# Patient Record
Sex: FEMALE | Race: WHITE | NOT HISPANIC OR LATINO | URBAN - METROPOLITAN AREA
[De-identification: names, ages, dates, MRNs, and addresses within clinical notes are randomized per-mention and may not be internally consistent; named-entity substitution may affect disease eponyms.]

---
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
## 2023-10-05 NOTE — ED ADULT TRIAGE NOTE - CCCP TRG CHIEF CMPLNT
It was a pleasure taking care of you today.  I've included a brief summary of our discussion and care plan from today's visit below.  Please review this information with your primary care provider.  _______________________________________________________________________    My recommendations are summarized as follows:    Labs and stool studies ordered today   OK to keep metamucil for now daily  Try to increase dietary fiber. A good goal is 30 gram/d. OK to supplement this with metamucil     Please call RN Care Coordinator Margot at [phone] with any questions or concerns.    Return to GI Clinic in 4-6 weeks to review your progress.    _______________________________________________________________________    How do I schedule labs, imaging studies, or procedures that were ordered in clinic today?      Labs: To schedule lab appointment at the [facility], use my chart or call [phone]. If you have a [place] lab closer to home where you are regularly seen you can give them a call.      Procedures: If a colonoscopy, upper endoscopy, breath test, esophageal manometry, or pH impedence was ordered today, our endoscopy team will call you to schedule this. If you have not heard from our endoscopy team within a week, please call [phone] option 2 to schedule.      Imaging Studies: If you were scheduled for a CT scan, X-ray, MRI, ultrasound, HIDA scan or other imaging study, please call [phone] to have this scheduled.      Referral: If a referral to another specialty was ordered, expect a phone call or follow instructions above. If you have not heard from anyone regarding your referral in a week, please call our clinic to check the status.      Who do I call with any questions after my visit?  Please be in touch if there are any further questions that arise following today's visit.  There are multiple ways to contact your gastroenterology care team.       During business hours, you may reach a  Gastroenterology nurse at [phone]     To schedule or reschedule an appointment, please call [phone].      You can always send a secure message through Amitive.  Amitive messages are answered by your nurse or doctor typically within 24 hours.  Please allow extra time on weekends and holidays.       For urgent/emergent questions after business hours, you may reach the on-call GI Fellow by contacting the [facility] at [phone].     How will I get the results of any tests ordered?    You will receive all of your results.  If you have signed up for [employer], any tests ordered at your visit will be available to you after your physician reviews them.  Typically this takes 1-2 weeks.  If there are urgent results that require a change in your care plan, your physician or nurse will call you to discuss the next steps.       What is Amitive?  Amitive is a secure way for you to access all of your healthcare records from the [facility].  It is a web based computer program, so you can sign on to it from any location.  It also allows you to send secure messages to your care team.  I recommend signing up for Amitive access if you have not already done so and are comfortable with using a computer.       How to I schedule a follow-up visit?  If you did not schedule a follow-up visit today, please call [phone] to schedule a follow-up office visit.      Sincerely,    Berna Martin PA-C  Gastroenterology     shortness of breath

## 2024-03-10 PROBLEM — N30.10 INTERSTITIAL CYSTITIS: Status: ACTIVE | Noted: 2021-11-29

## 2024-05-17 NOTE — ED PROVIDER NOTE - PROVIDER TOKENS
Render Risk Assessment In Note?: no Detail Level: Simple Additional Notes: - new onset.\\n- no triggers identified\\n- no family history of autoimmune disease\\n- slowly worsened over the last month PROVIDER:[TOKEN:[[id number]],FOLLOWUP:[1-3 Days]] PROVIDER:[TOKEN:[[id number]],FOLLOWUP:[1-3 Days]],PROVIDER:[TOKEN:[[id number]]]

## 2024-09-11 NOTE — ED ADULT NURSE NOTE - CAS DISCH TRANSFER METHOD
-Asymptomatic   -incidental finding found on CTA  -Seen by vascular surgery and no recommendations for intervention  -Management per primary team   Private car

## 2025-04-21 NOTE — ED PROVIDER NOTE - EKG #1 DATE/TIME
Subjective     Ally Carpio is a 40 y.o. right handed female with hx of UC recently diagnosed in 1/2024 currently on adalimumab since late April of 2024, psoriasis since 2023 (previously on topical treatment only), lTP since November of 2023 (never required treatment), chronic menorrhagia, iron deficiency anemia, RLS since 2014, prior smoking (quit in 2017 smoked 1 ppd for 12 years before that), and hypothyroidism who is here for follow up.       History of Present Illness   [facility] NEURO IMMUNOLOGY HPI: Date of Onset: 2/2024  Date of Diagnosis: diagnosis not finalized yet  Last MRI Brain: 3/2025  Last MRI Cervical: 9/2024  Last MRI Thoracis: 9/2024  Last OCT/VEP: not done  Last CBC: NA  Disease Course at Onset: RR  Disease Course Now: RR  Current Disease Modifying Therapies: None  Previous Disease Modifying Therapies: prednisone  Cerebrospinal Fluid: not done  Gary [place]amo Virus: Positive 9/2024, index 2.8  Varicella Zoster Virus: Positive 9/2024  Hep Panel: negative  Neuromyelitis Optica: negative 9/2024  Myelin Oligodendrocyte Glycoprotein: negative 9/2024    HPI  Interval hx:  Brain MRI in March stable without new or active lesions. She messaged me about nee bilateral shoulder weakness and right shoulder pain. I ordered muscle enzymes and inflammatory markers that all came back negative. She saw orthopedics and is being referred to a shoulder specialist. OCT machine still down.     MS Symptom Review: Weakness: yes left then right leg, also subjective weakness of both shoulders.   Sensory Changes: yes presenting symptom in the whole body   Incoordination: yes both hands  Falling: once  Gait Change: limping  Painful Vision Loss: bilateral R>L visual blurring with pain + floaters in the right eye  Double Vision: No double vision   Vertigo: woozy and nauseous  Facial/Bulvar Weakness: No facial/bulvar weakness  Bladder/Bowel Dysfunction: yes transient frequency, urgency, and occasional  incontinence  Spasticity: No spasticity  Tonic Spasms: yes isometric painful spasms in all limbs and the right part of the trunk below the ribcage  Tremors: No tremors  Restless Leg Syndrome: yes for at least ten years both legs related to iron deficiency   Dystonia: dystonic flexion of the toes and twitching of the right thumb  Other Movement Disorders: twitches allover the body  Heat Sensitivity: heat makes her tingling and nausea worse but makes her muscles feel better  Fatigue: yes  Depression: depressed mood because of all the symptoms  Anxiety: yes but better  Cognitive Changes: word finding difficulty   Disease Modifying Therapies: No Disease modifying therapies   Side Effects: No side effects  Is the patient taking Vitamin D supplements? Yes once a week.   Is the patient taking Symptomatic medications? Pregabalin,        Past Surgical History:   Procedure Laterality Date    CERVICAL SPINE SURGERY  2024    C6-C7 Cervical Spine Arthroplasty ~ Anterior Approach     SECTION, LOW TRANSVERSE      x 2, .     COLONOSCOPY      DILATION AND CURETTAGE OF UTERUS      HYSTEROSCOPY  2024    D & C Hysteroscopy    UPPER GASTROINTESTINAL ENDOSCOPY       Social History     Tobacco Use    Smoking status: Former     Current packs/day: 0.00     Average packs/day: 1 pack/day for 12.5 years (12.5 ttl pk-yrs)     Types: Cigarettes     Start date: 2004     Quit date: 2017     Years since quittin.3    Smokeless tobacco: Never   Substance Use Topics    Alcohol use: Not Currently     Comment: on rare occasions     Allergies   Allergen Reactions    Adhesive Tape-Silicones Rash    Balsalazide Other     Mouth ulcers    Iron GI Upset    Mesalamine Rash     Visit Vitals  /76   Pulse 79   LMP 2024   OB Status Hysterectomy   Smoking Status Former       Objective   Neurological Exam  Mental Status  Awake, alert and oriented to person, place and time. Recent and remote memory are intact. Speech is  normal. Language is fluent with no aphasia. Attention and concentration are normal.    Cranial Nerves  CN II: Visual fields full to confrontation.  CN III, IV, VI: Extraocular movements intact bilaterally.   Right pupil: 2 mm.   Left pupil: 4 mm.  CN V: Facial sensation is normal.  CN VII: Full and symmetric facial movement.  CN VIII: Hearing is normal.  CN IX, X: Palate elevates symmetrically. Normal gag reflex.  CN XI: Shoulder shrug strength is normal.  CN XII: Tongue midline without atrophy or fasciculations.    Motor  Normal muscle bulk throughout. No abnormal involuntary movements.  4/5 BLE most pronounced distally. Ratchet like mild weakness both shoulders. .    Sensory  Right hemisensory level T8.    Reflexes                                            Right                      Left  Brachioradialis                    2+                         3+  Biceps                                 2+                         3+  Triceps                                2+                         3+  Patellar                                4+                         4+  Achilles                                3+                         3+    Right pathological reflexes: Ankle clonus absent.  Left pathological reflexes: Ankle clonus absent.    Coordination  Right: Finger-to-nose normal. Rapid alternating movement normal.Left: Finger-to-nose normal. Rapid alternating movement normal.    Gait    High steppage gait wide-based gait. .    Physical Exam  Eyes:      Extraocular Movements: Extraocular movements intact.   Neurological:      Deep Tendon Reflexes:      Reflex Scores:       Tricep reflexes are 2+ on the right side and 3+ on the left side.       Bicep reflexes are 2+ on the right side and 3+ on the left side.       Brachioradialis reflexes are 2+ on the right side and 3+ on the left side.       Patellar reflexes are 4+ on the right side and 4+ on the left side.       Achilles reflexes are 3+ on the right side and 3+ on  the left side.  Psychiatric:         Speech: Speech normal.       CLINICAL SCORES  Scales and Scores:      Assessment/Plan     This is a 40 y.o. year old right handed female  With a PMH of UC recently diagnosed in 1/2024 currently on adalimumab since late April of 2024, psoriasis since 2023 (previously on topical treatment only), lTP since November of 2023 (never required treatment), chronic menorrhagia, iron deficiency anemia, RLS since 2014, prior smoking (quit in 2017 smoked 1 ppd for 12 years before that), and hypothyroidism who presents with several sequential neurological symptoms since February of 2024 including paraesthesia of all limbs, trunk, and face, LLE then RLE weakness, transient bladder dysfunction, painful isometric tonic spasms in all limbs and truncal (below the right costal margin), wooziness, nausea, brain fogginess, headaches, and most recently bilateral visual blurring with right eye pain. Symptoms started as she was weaning down prednisone for UC. Symptoms improved only partially after cervical decompression for disc prolapse but then continued to worsen especially after she started adalimumab for UC.  The Neurological Exam showed unequal L>R pupils, mild BLE weakness distal > proximal, right hemisensory level T8, and high-steppage wide-based gait.   The MRI brain showed a few non-enhancing large demyelinating-like lesions in periventricular, subcortical, and juxtacortical locations stable in September compared to March 2024. MRI cervical spine without cord lesions in April and September 2024 but did show central disc prolapse with canal stenosis that was released. MRI thoracic spine in April did not show cord lesions either. EMG normal.   Cerebrospinal Fluid not done  OCT/VEP not done  MS Mimics ruled out including negative MOG, AQP4, HLAB51, and CT chest.   The overall picture is suspicious for a systemic autoimmune disease mimicking ITP, UC, psoriasis, and MS since all four conditions  occurred within nearly a one-year-period. She admits to painful mouth ulcers so Behchet's is on the top of the differential. Alternatively, she may have multiple coexisting autoimmune disorders. Her neurological presentation is highly suspicious for myelitis (acute onset while tapering down prednisone, MS juanito, tonic spasms, brain demyelinating-like lesion, worsening with humira, etc.) but the scans have been negative. Therefore, causes of MRI negative myelitis should be considered especially MOGAD but she tested negative for the antibody. She may also simply have coexisting MS or double seronegative NMOSD. An FND with incidental RIS is also possible.   Disease Modifying Therapies: she should stop adalimumab and consider switching to an agent that would work for both UC and MS like zeposia once mimickers are ruled out. Tysabri is relatively contraindicated given JCV IGG positivity.     10/15/2024: She tested negative for MOG, AQP4, and HLAB51. CT chest negative for sarcoidosis. MRI thoracic did not show any lesions. It did show central disc prolapse with canal stenosis but without cord changes. MRI orbit showed questionable small area of enhancement in the retrobulbar left optic nerve that was seen on coronal but not axial post-CHRISTINA images. I arranged for outpatient solumedrol for her to see if it will help. Her vision and headache improved only for one days after solumedrol and then she became even worse after completing the three days with more headache and more fatigue. Reports asymmetric smile and facial weakness but no evidence of facial weakness on exam. The diagnosis remains unclear. Her spine MRIs repeatedly failed to show a lesion that would be symptomatic and explain her symptoms. Her brain lesions are all in locations expected to be clinically silent. Her orbital findings are questionable and could be artifactual especially with the unusual steroid response. I will plan to repeat her brain MRI in March to  monitor for any new lesions and will also perform OCT at that time to see if she will develop left RNFL thinning in the post-acute phase. If the diagnosis remains unclear, we may need to do an LP for OCBs. In any case, in the presence of brain demyelination (whether symptomatic or not), it is best to avoid TNFAIs and start zeposia instead for her UC.     4/21/2025: Brain MRI in March stable without new or active lesions. She messaged me about new bilateral shoulder weakness and right shoulder pain. I ordered muscle enzymes and inflammatory markers that all came back negative. She saw orthopedics and is being referred to a shoulder specialist. OCT machine still down. Exam with some new mild ratchet-like weakness in the shoulders and more prominent diffuse L>R hyperreflexia. She denies any recent stressors and doesn't think that her shoulder symptoms could be stress or mood-related. She did start wellbutrin and notices that her mood has definitely improved but without improvement in the physical symptoms. Shoulder weakness and pain are not typical MS symptoms so I will refer her to get an EMG myopathy protocol (previously had CTS protocol and LE EMGs) and I do agree with shoulder ortho consultation. Will continue to monitor her brain demyelination off DMT with periodic MRIs and will attempt OCT in the next visit or otherwise consider referral to Neuro-ophthalmology or VEP.     PLAN:  Your MRI did not show any new or active lesions, which is great.     Your muscle enzymes and inflammatory markers came back normal arguing against muscle inflammation.     I will order a repeat EMG to focus on your shoulder muscles to see if you might have a non-inflammatory muscle disease.     I will repeat your brain MRI again in September to monitor for MS. I will try to do the OCT eye test on you if the machine is fixed by then.     Please continue to take vitamin D same dose.     Please keep your appointment with orthopedics.     I  will refer you to PT.     Follow up in September or October after the MRI.     The impression and plan were discussed with the patient and family (if present) in details and all questions answered.      Paul Holt MD]                04-Jun-2019 15:39